# Patient Record
Sex: FEMALE | URBAN - METROPOLITAN AREA
[De-identification: names, ages, dates, MRNs, and addresses within clinical notes are randomized per-mention and may not be internally consistent; named-entity substitution may affect disease eponyms.]

---

## 2022-02-24 ENCOUNTER — OFFICE VISIT (OUTPATIENT)
Dept: URBAN - METROPOLITAN AREA CLINIC 3 | Facility: CLINIC | Age: 42
End: 2022-02-24

## 2022-02-24 DIAGNOSIS — H57.12 OCULAR PAIN, LEFT EYE: ICD-10-CM

## 2022-02-24 DIAGNOSIS — H10.413 CHRONIC GIANT PAPILLARY CONJUNCTIVITIS, BILATERAL: Primary | ICD-10-CM

## 2022-02-24 PROCEDURE — 99202 OFFICE O/P NEW SF 15 MIN: CPT | Performed by: OPTOMETRIST

## 2022-02-24 RX ORDER — PREDNISOLONE ACETATE 10 MG/ML
1 % SUSPENSION/ DROPS OPHTHALMIC
Qty: 5 | Refills: 0 | Status: INACTIVE
Start: 2022-02-24 | End: 2022-02-24

## 2022-02-24 RX ORDER — PREDNISOLONE ACETATE 10 MG/ML
1 % SUSPENSION/ DROPS OPHTHALMIC
Qty: 5 | Refills: 0 | Status: INACTIVE
Start: 2022-02-24 | End: 2022-03-07

## 2022-02-24 ASSESSMENT — INTRAOCULAR PRESSURE
OS: 24
OD: 22

## 2022-02-24 NOTE — IMPRESSION/PLAN
Impression: Ocular pain, left eye: H57.12. Plan: No ophthalmic etiology for headaches/pain in around eye. Follow-up with Primary Care Provider for evaluation and management.

## 2022-02-24 NOTE — IMPRESSION/PLAN
Impression: Chronic giant papillary conjunctivitis, bilateral: H10.413. Plan: Start prednisolone acetate Q2h while awake for 2 days then four times a day for 4 days then TID for 3 days then BID for 2 days then QD for 1 day then STOP. (shake very well). Return immediately for increased pain, increased redness, or decreased vision. No contact abby wear for 2 weeks and f/u with CL provider for fitting.

## 2022-03-10 ENCOUNTER — OFFICE VISIT (OUTPATIENT)
Dept: URBAN - METROPOLITAN AREA CLINIC 3 | Facility: CLINIC | Age: 42
End: 2022-03-10

## 2022-03-10 PROCEDURE — 92012 INTRM OPH EXAM EST PATIENT: CPT | Performed by: OPTOMETRIST

## 2022-03-10 ASSESSMENT — INTRAOCULAR PRESSURE
OS: 20
OD: 21

## 2022-03-10 NOTE — IMPRESSION/PLAN
Impression: Chronic giant papillary conjunctivitis, bilateral: H10.413. Plan: Resolved. Finished  prednisolone acetate. Recommend to f/u with optometrist for CL provider for fitting. Patient interested on Lasik evaluation.

## 2022-04-11 ENCOUNTER — OFFICE VISIT (OUTPATIENT)
Dept: URBAN - METROPOLITAN AREA CLINIC 4 | Facility: CLINIC | Age: 42
End: 2022-04-11

## 2022-04-11 DIAGNOSIS — H53.8 OTHER VISUAL DISTURBANCES: Primary | ICD-10-CM

## 2022-04-11 ASSESSMENT — KERATOMETRY
OD: 43.25
OS: 43.75

## 2022-04-11 ASSESSMENT — INTRAOCULAR PRESSURE
OS: 18
OD: 17